# Patient Record
Sex: MALE | Race: OTHER | HISPANIC OR LATINO | Employment: STUDENT | URBAN - METROPOLITAN AREA
[De-identification: names, ages, dates, MRNs, and addresses within clinical notes are randomized per-mention and may not be internally consistent; named-entity substitution may affect disease eponyms.]

---
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
## 2025-02-20 NOTE — ED PROVIDER NOTES
History  Chief Complaint   Patient presents with    Neck Pain     Patient was tackle today at [email or web address] / [employer] has c/o left lateral neck pain  Denies numbness, tingling in extremities  14-year-old male presents with neck pain after a and injury of football practice  He reports he was wearing full pads and helmet  He was tackled from behind and whenever she went to the ground the other player hit his neck with his knee  He denies head injury or loss consciousness  He denies any neurological deficits including paresthesias, weakness, or other acute issues  His only complaint is that of right lateral neck pain which is worse with movement and direct palpation  Patient reports that he finished practice and afterwards was directed to the emergency department for evaluation  Neck Injury   Severity:  Moderate  Onset quality:  Sudden  Timing:  Constant  Progression:  Partially resolved  Chronicity:  New  Associated symptoms: myalgias    Associated symptoms: no abdominal pain, no chest pain, no congestion, no cough, no diarrhea, no ear pain, no fatigue, no fever, no headaches, no loss of consciousness, no nausea, no rash, no rhinorrhea, no shortness of breath, no sore throat, no vomiting and no wheezing        None       History reviewed  No pertinent past medical history  History reviewed  No pertinent surgical history  History reviewed  No pertinent family history  I have reviewed and agree with the history as documented  Social History   Substance Use Topics    Smoking status: Not on file    Smokeless tobacco: Not on file    Alcohol use Not on file        Review of Systems   Constitutional: Negative for activity change, chills, fatigue and fever  HENT: Negative  Negative for congestion, ear pain, postnasal drip, rhinorrhea, sinus pain, sore throat and trouble swallowing  Eyes: Negative  Respiratory: Negative  Negative for cough, shortness of breath and wheezing      Cardiovascular: Today's date 2/20/2025  Admission date 2/16/2025  Hospital Room /A   Referring Provider Holly Edwards PA-C     Reason for initial visit:  Evaluation and management of Type 2 diabetes mellitus with nephropathy, retinopathy and neuropathy    History of Present Illness:  (portions of this note are brought forward from previous endocrine note; reviewed and edited by me as appropriate)       This is a 60 year old Female with a history of DM2, ESRD on HD since 2015, R BKA 2020, HTN, HLD and hypercalcemia admitted for DDKT 2/16/2025. She is on steroid taper as follows:    2/19: prednisone 30 mg BID  2/20: prednisone 20 mg BID  2/21: prednisone 20 mg daily  2/28: prednisone 15 mg daily  3/7: prednisone 10 mg daily  3/14: prednisone 5 mg daily    Recent events/ROS: Low urine output 2/18 -received dialysis.  IV insulin stopped 2/18 at 10:50 AM. NPH decreased then stopped 2/19. Decreased appetite. NPO for AMS. May need to consider NG with tube feeds    Current hospital regimen for diabetes:   Humalog low-dose correction with meals    Current oral intake status Patient Diet Preferences Continuous Other - Enter in Comments at Right (No Cold Deli Meats)  Daily W Lunch; Ensure Max Protein/Max Protein Low Calorie Supplement, Chocolate Oral Nutrition Supplement  Daily W Dinner; Product Not Listed - See Order Comments (Strawberry Ensure Max) Oral Nutrition Supplement  Consistent Carb Moderate (45-75 Gm/Meal); Yes, Medical Nutrition Management by Rd (Registered Dietitian) Diet    Blood sugars while hospitalized   Recent Labs   Lab 02/19/25  1443 02/19/25  1539 02/19/25  1715 02/19/25  1811 02/19/25  2017 02/19/25  2155 02/19/25  2359 02/20/25  0158 02/20/25  0358 02/20/25  0602 02/20/25  0908   GLUCOSE BEDSIDE 55* 80 54* 96 81 83 90 110* 115* 113* 126*     Patient has Type 2 diabetes mellitus with nephropathy, retinopathy and neuropathy. Patient was diagnosed around 2002. Home regimen includes Lantus 20 units QHS, Humalog  10 units AC and Mounjaro 10 mg weekly. No insulin the past few weeks. BG  mg/dL without insulin.    Patient has not been hospitalized or utilized emergency services for blood sugars in the recent past. Patient sees Dr. Hernandez for diabetes management as an outpatient. Last visit 4/2023. Has a scheduled visit 2/20/25.    Diabetes complications include:   Retinopathy - retinopathy - proliferative right eye and retinopathy - proliferative left eye.    Nephropathy- ESRD on HD, now s/p DDKT 2/16/2025.  Neuropathy- Peripheral. Patient does have a history of ulcers and/or amputations to the lower extremities.    History:  Past medical, surgical, family and social history reviewed from initial endocrine note.     Physical Exam:    Visit Vitals  /77 (BP Location: LUE - Left upper extremity, Patient Position: Semi-Agarwal's)   Pulse 97   Temp 97.5 °F (36.4 °C) (Oral)   Resp 16   Ht 5' 6\" (1.676 m)   Wt 71 kg (156 lb 8.4 oz)   LMP 07/11/2018 (Approximate)   SpO2 100%   BMI 25.26 kg/m²     Constitutional: Resting, well nourished female and in NAD.  Respiratory: non-labored. No cyanosis.   Cardiac: RRR per monitor  GI:  non-distended and no guarding.   Skin: Warm and dry, normal texture and temperature and no visible rashes.    Musculoskeletal: RIVERA x4, R BKA with prosthesis in place  Neuro:lethargic     Lab Review:  Hemoglobin A1C (%)   Date Value   01/24/2025 5.4     HGB (g/dL)   Date Value   02/20/2025 7.9 (L)     Creatinine (mg/dL)   Date Value   02/20/2025 3.70 (H)         Latest Reference Range & Units 02/04/25 10:49   CALCIUM 8.4 - 10.2 mg/dL 11.0 (H)   VITAMIND, 25 HYDROXY 30.0 - 100.0 ng/mL 19.4 (L)   PARATHYROID HORMONE 19 - 88 pg/mL 377 (H)     Assessment:  Type 2 diabetes mellitus with nephropathy, retinopathy and neuropathy  DDKT 2/16/2025  R BKA amputation on 4/24/20  Hypertension  Hyperlipidemia, on statin PTA   Hypercalcemia with secondary HPT, 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    Plan:  Not hungry after surgery,  Negative for chest pain  Gastrointestinal: Negative for abdominal pain, constipation, diarrhea, nausea and vomiting  Endocrine: Negative  Genitourinary: Negative  Musculoskeletal: Positive for myalgias  Negative for arthralgias and back pain  Skin: Negative  Negative for rash  Allergic/Immunologic: Negative  Neurological: Negative  Negative for loss of consciousness and headaches  Hematological: Negative  Psychiatric/Behavioral: Negative  Physical Exam  Physical Exam   Constitutional: He is oriented to person, place, and time  He appears well-developed and well-nourished  No distress  HENT:   Head: Normocephalic and atraumatic  Eyes: Pupils are equal, round, and reactive to light  Neck: Trachea normal  Neck supple  Spinous process tenderness (minimal C4-C6) and muscular tenderness present  Cardiovascular: Normal rate, regular rhythm and normal heart sounds  Pulmonary/Chest: Effort normal and breath sounds normal  No respiratory distress  Abdominal: Soft  Bowel sounds are normal  He exhibits no distension  There is no tenderness  There is no guarding  Musculoskeletal: Normal range of motion  He exhibits no edema  Neurological: He is alert and oriented to person, place, and time  Skin: Skin is warm and dry  Capillary refill takes less than 2 seconds  He is not diaphoretic  Psychiatric: He has a normal mood and affect  His behavior is normal    Nursing note and vitals reviewed        Vital Signs  ED Triage Vitals [10/15/18 1932]   Temperature Pulse Respirations Blood Pressure SpO2   97 4 °F (36 3 °C) 68 16 (!) 153/57 99 %      Temp src Heart Rate Source Patient Position - Orthostatic VS BP Location FiO2 (%)   Tympanic Monitor Sitting Left arm --      Pain Score       --           Vitals:    10/15/18 1932 10/15/18 2143   BP: (!) 153/57 117/73   Pulse: 68 67   Patient Position - Orthostatic VS: Sitting Sitting       Visual Acuity      ED Medications  Medications - No but was also on Mounjaro outpatient.  Recurrent low blood sugars - NPH has been stopped   - Continue Humalog low-dose correction only with meals  - Continue to check BG TID AC, HS and PRN   - Continue hypoglycemia protocol PRN   - Continue consistent carb diet  .  Please notify endocrine if tube feeds are started    Donna is ready for discharge from my viewpoint: No  Workup needed prior to discharge: None   Medications changes at discharge:   insulin, doses TBD  Follow up appointments needed after discharge:   Dr. Hernandez, endocrine    Estimated Date of Discharge documented: 2/28/2025    We will continue to follow and titrate/manage medications as appropriate.     Case discussed and plan developed with attending endocrinologist, RUPINDER Underwood      Patient seen and examined with JOSE  Glycemic trends, labs, medications reviewed.  >50% of the patient care time, including face to face contact and coordination of care was spent by myself as the primary provider.  100% of the Medical Decision Making for this patient was done by myself    Eats little.  No nausea, vomiting.    Blood pressure 118/53, pulse (!) 101, temperature 98.2 °F (36.8 °C), temperature source Axillary, resp. rate (!) 12, height 5' 6\" (1.676 m), weight 70.4 kg (155 lb 3.3 oz), last menstrual period 07/11/2018, SpO2 100%, not currently breastfeeding.  AAOx3, NAD. Resting   EOMI  Neck supple.  Normal respiratory efforts.  Visible skin clear    Plan:  As above. Not eating, minimal insulin requirements  Continue to monitor on correction scale today  Dw ICU team        data to display    Diagnostic Studies  Results Reviewed     None                 CT spine cervical without contrast   Final Result by Lamon Curling, MD (10/15 2156)      No acute fracture or traumatic malalignment of the visualized cervical spine  Workstation performed: [id number]                    Procedures  Procedures       Phone Contacts  ED Phone Contact    ED Course                               MDM  Number of Diagnoses or Management Options  Diagnosis management comments: 80-year-old boy presents with cervical pain after an injury while playing football  He denies headache or loss of consciousness  His pain is primarily right-sided but he has mild pain along his mid cervical spine  CT is negative for acute fracture or malalignment  He is fully neurologically intact  Discussed cervical strain sprain instructions was as well as need for outpatient follow-up as well as reasons to return to the ER  Amount and/or Complexity of Data Reviewed  Tests in the radiology section of CPT®: ordered and reviewed  Independent visualization of images, tracings, or specimens: yes      CritCare Time    Disposition  Final diagnoses:   None     ED Disposition     None      Follow-up Information    None         Patient's Medications    No medications on file     No discharge procedures on file      ED Provider  Electronically Signed by           Lela Tariq DO  10/15/18 0714

## 2025-06-08 ENCOUNTER — HOSPITAL ENCOUNTER (EMERGENCY)
Facility: HOSPITAL | Age: 20
Discharge: HOME | End: 2025-06-08
Attending: EMERGENCY MEDICINE | Admitting: EMERGENCY MEDICINE
Payer: COMMERCIAL

## 2025-06-08 ENCOUNTER — APPOINTMENT (EMERGENCY)
Dept: RADIOLOGY | Facility: HOSPITAL | Age: 20
End: 2025-06-08
Payer: COMMERCIAL

## 2025-06-08 VITALS
TEMPERATURE: 99.9 F | HEART RATE: 95 BPM | DIASTOLIC BLOOD PRESSURE: 83 MMHG | OXYGEN SATURATION: 97 % | WEIGHT: 249.6 LBS | RESPIRATION RATE: 20 BRPM | SYSTOLIC BLOOD PRESSURE: 138 MMHG

## 2025-06-08 DIAGNOSIS — R07.9 CHEST PAIN, UNSPECIFIED TYPE: Primary | ICD-10-CM

## 2025-06-08 LAB
ATRIAL RATE: 95 BPM
P AXIS: 32 DEGREES
PR INTERVAL: 118 MS
QRS AXIS: 39 DEGREES
QRSD INTERVAL: 84 MS
QT INTERVAL: 326 MS
QTC INTERVAL: 410 MS
T WAVE AXIS: 32 DEGREES
VENTRICULAR RATE: 95 BPM

## 2025-06-08 PROCEDURE — 93005 ELECTROCARDIOGRAM TRACING: CPT

## 2025-06-08 PROCEDURE — 96372 THER/PROPH/DIAG INJ SC/IM: CPT

## 2025-06-08 PROCEDURE — 93010 ELECTROCARDIOGRAM REPORT: CPT | Performed by: INTERNAL MEDICINE

## 2025-06-08 PROCEDURE — 99284 EMERGENCY DEPT VISIT MOD MDM: CPT | Performed by: EMERGENCY MEDICINE

## 2025-06-08 PROCEDURE — 71046 X-RAY EXAM CHEST 2 VIEWS: CPT

## 2025-06-08 PROCEDURE — 99284 EMERGENCY DEPT VISIT MOD MDM: CPT

## 2025-06-08 RX ORDER — KETOROLAC TROMETHAMINE 30 MG/ML
15 INJECTION, SOLUTION INTRAMUSCULAR; INTRAVENOUS ONCE
Status: COMPLETED | OUTPATIENT
Start: 2025-06-08 | End: 2025-06-08

## 2025-06-08 RX ADMIN — KETOROLAC TROMETHAMINE 15 MG: 30 INJECTION, SOLUTION INTRAMUSCULAR; INTRAVENOUS at 22:51

## 2025-06-09 NOTE — ED PROVIDER NOTES
"Time reflects when diagnosis was documented in both MDM as applicable and the Disposition within this note       Time User Action Codes Description Comment    6/8/2025 11:21 PM Juaquin Lewis Add [R07.9] Chest pain, unspecified type           ED Disposition       ED Disposition   Discharge    Condition   Stable    Date/Time   Sun Jun 8, 2025 11:21 PM    Comment   Monroe Cuevas discharge to home/self care.                   Assessment & Plan       Medical Decision Making  20-year-old male, presents with chest pain.  Differential diagnosis includes ACS, pneumothorax, muscle pain among other diagnoses.  Patient looks well and in no distress.  Patient with no risk factors for cardiac disease, reports he has had similar symptoms in the past.  Will give pain medication, EKG chest x-ray ordered.    Amount and/or Complexity of Data Reviewed  Radiology: ordered and independent interpretation performed. Decision-making details documented in ED Course.  ECG/medicine tests: ordered and independent interpretation performed. Decision-making details documented in ED Course.    Risk  Prescription drug management.        ED Course as of 06/08/25 2323   Sun Jun 08, 2025   2319 Chest x-ray dependently reviewed myself, no pneumothorax, infiltrate, or effusion.  No acute findings.   2322 Patient reports feeling better, results reviewed and discussed with patient, no acute abnormality.  Patient instructed to follow-up with primary doctor, follow-up or return for any worsening or new concerning symptoms.       Medications   ketorolac (TORADOL) injection 15 mg (15 mg Intramuscular Given 6/8/25 2251)       ED Risk Strat Scores              CRAFFT      Flowsheet Row Most Recent Value   CRAFFT Initial Screen: During the past 12 months, did you:    1. Drink any alcohol (more than a few sips)?  No Filed at: 06/08/2025 2308   2. Smoke any marijuana or hashish No Filed at: 06/08/2025 2308   3. Use anything else to get high? (\"anything else\" includes " illegal drugs, over the counter and prescription drugs, and things that you sniff or 'mcdonald')? No Filed at: 06/08/2025 3931              No data recorded                            History of Present Illness       Chief Complaint   Patient presents with    Chest Pain     Pt reports he woke up this morning with pins and needles all over his body and chest pain. Pt reports earlier today he had some SOB but does not upon arrival. Pt states he thinks he has hx of anxiety. No meds pta.        Past Medical History[1]   Past Surgical History[2]   Family History[3]   Social History[4]   E-Cigarette/Vaping    E-Cigarette Use Current Every Day User       E-Cigarette/Vaping Substances    Nicotine Yes       I have reviewed and agree with the history as documented.     20-year-old male, presents with chest pain.  Reports intermittent, pinching pain to left chest that has been occurring for the past 2 weeks.  Patient states it is worse when he lies down.  Patient denies any shortness of breath when asked by myself.  Denies any pain or difficulty with exertion.  Patient states he has had similar symptoms in the past.  Denies any fevers or cough.  No leg pain or swelling.      History provided by:  Patient   used: No    Chest Pain  Associated symptoms: no cough, no fever and no shortness of breath        Review of Systems   Constitutional: Negative.  Negative for fever.   Respiratory: Negative.  Negative for cough and shortness of breath.    Cardiovascular:  Positive for chest pain.   Gastrointestinal: Negative.    Neurological: Negative.            Objective       ED Triage Vitals   Temperature Pulse Blood Pressure Respirations SpO2 Patient Position - Orthostatic VS   06/08/25 2229 06/08/25 2229 06/08/25 2240 06/08/25 2229 06/08/25 2229 06/08/25 2240   99.9 °F (37.7 °C) (!) 117 138/83 20 97 % Lying      Temp Source Heart Rate Source BP Location FiO2 (%) Pain Score    06/08/25 2229 06/08/25 2229 06/08/25 2240 --  06/08/25 2251    Oral Monitor Right arm  6      Vitals      Date and Time Temp Pulse SpO2 Resp BP Pain Score FACES Pain Rating User   06/08/25 2251 -- -- -- -- -- 6 -- JT   06/08/25 2247 -- 95 -- -- -- -- -- JG   06/08/25 2240 -- -- -- -- 138/83 -- -- SD   06/08/25 2229 99.9 °F (37.7 °C) 117 97 % 20 -- -- -- SD            Physical Exam  Vitals and nursing note reviewed.   Constitutional:       General: He is not in acute distress.  HENT:      Head: Normocephalic and atraumatic.      Mouth/Throat:      Mouth: Mucous membranes are moist.      Pharynx: Oropharynx is clear.     Eyes:      Extraocular Movements: Extraocular movements intact.      Pupils: Pupils are equal, round, and reactive to light.       Cardiovascular:      Rate and Rhythm: Normal rate and regular rhythm.      Pulses:           Radial pulses are 2+ on the right side and 2+ on the left side.      Heart sounds: No murmur heard.  Pulmonary:      Effort: Pulmonary effort is normal.      Breath sounds: Normal breath sounds.   Abdominal:      Palpations: Abdomen is soft.      Tenderness: There is no abdominal tenderness.     Musculoskeletal:         General: No swelling or tenderness. Normal range of motion.      Cervical back: Normal range of motion and neck supple.     Skin:     General: Skin is warm and dry.     Neurological:      General: No focal deficit present.      Mental Status: He is alert and oriented to person, place, and time.      Motor: No weakness.      Gait: Gait normal.         Results Reviewed       None            XR chest 2 views    (Results Pending)       ECG 12 Lead Documentation Only    Date/Time: 6/8/2025 10:39 PM    Performed by: Juaquin Lewis MD  Authorized by: Juaquin Lewis MD    ECG reviewed by me, the ED Provider: yes    Patient location:  ED  Rate:     ECG rate assessment: normal    Rhythm:     Rhythm: sinus rhythm    Ectopy:     Ectopy: none    QRS:     QRS axis:  Normal    QRS intervals:  Normal  Conduction:      Conduction: normal    ST segments:     ST segments:  Normal  Comments:      Sinus rhythm at 95, no acute changes      ED Medication and Procedure Management   Prior to Admission Medications   Prescriptions Last Dose Informant Patient Reported? Taking?   lidocaine (XYLOCAINE) 5 % ointment   No No   Sig: Apply topically as needed for mild pain      Facility-Administered Medications: None     Patient's Medications   Discharge Prescriptions    No medications on file     No discharge procedures on file.  ED SEPSIS DOCUMENTATION   Time reflects when diagnosis was documented in both MDM as applicable and the Disposition within this note       Time User Action Codes Description Comment    6/8/2025 11:21 PM Juaquin Lewis Add [R07.9] Chest pain, unspecified type                      [1] No past medical history on file.  [2] No past surgical history on file.  [3] No family history on file.  [4]   Social History  Tobacco Use    Smoking status: Never    Smokeless tobacco: Never   Vaping Use    Vaping status: Every Day    Substances: Nicotine   Substance Use Topics    Alcohol use: Not Currently    Drug use: Yes     Types: Marijuana        Juaquin Lewis MD  06/08/25 2160

## 2025-06-10 ENCOUNTER — HOSPITAL ENCOUNTER (EMERGENCY)
Facility: HOSPITAL | Age: 20
Discharge: HOME | End: 2025-06-10
Attending: EMERGENCY MEDICINE | Admitting: EMERGENCY MEDICINE
Payer: COMMERCIAL

## 2025-06-10 VITALS
HEART RATE: 85 BPM | DIASTOLIC BLOOD PRESSURE: 70 MMHG | TEMPERATURE: 98.1 F | OXYGEN SATURATION: 99 % | SYSTOLIC BLOOD PRESSURE: 123 MMHG | WEIGHT: 250 LBS | RESPIRATION RATE: 16 BRPM

## 2025-06-10 DIAGNOSIS — R07.9 CHEST PAIN: Primary | ICD-10-CM

## 2025-06-10 DIAGNOSIS — M94.0 COSTOCHONDRITIS: ICD-10-CM

## 2025-06-10 DIAGNOSIS — F41.9 ANXIETY: ICD-10-CM

## 2025-06-10 LAB
ATRIAL RATE: 77 BPM
P AXIS: 24 DEGREES
PR INTERVAL: 116 MS
QRS AXIS: 36 DEGREES
QRSD INTERVAL: 82 MS
QT INTERVAL: 352 MS
QTC INTERVAL: 399 MS
T WAVE AXIS: 30 DEGREES
VENTRICULAR RATE: 77 BPM

## 2025-06-10 PROCEDURE — 96372 THER/PROPH/DIAG INJ SC/IM: CPT

## 2025-06-10 PROCEDURE — 99284 EMERGENCY DEPT VISIT MOD MDM: CPT | Performed by: EMERGENCY MEDICINE

## 2025-06-10 PROCEDURE — 93010 ELECTROCARDIOGRAM REPORT: CPT

## 2025-06-10 PROCEDURE — 93005 ELECTROCARDIOGRAM TRACING: CPT

## 2025-06-10 PROCEDURE — 99284 EMERGENCY DEPT VISIT MOD MDM: CPT

## 2025-06-10 RX ORDER — KETOROLAC TROMETHAMINE 30 MG/ML
30 INJECTION, SOLUTION INTRAMUSCULAR; INTRAVENOUS ONCE
Status: COMPLETED | OUTPATIENT
Start: 2025-06-10 | End: 2025-06-10

## 2025-06-10 RX ORDER — HYDROXYZINE HYDROCHLORIDE 25 MG/1
25 TABLET, FILM COATED ORAL ONCE
Status: COMPLETED | OUTPATIENT
Start: 2025-06-10 | End: 2025-06-10

## 2025-06-10 RX ORDER — HYDROXYZINE HYDROCHLORIDE 25 MG/1
25 TABLET, FILM COATED ORAL EVERY 6 HOURS PRN
Qty: 12 TABLET | Refills: 0 | Status: SHIPPED | OUTPATIENT
Start: 2025-06-10

## 2025-06-10 RX ORDER — IBUPROFEN 600 MG/1
600 TABLET, FILM COATED ORAL EVERY 6 HOURS PRN
Qty: 30 TABLET | Refills: 0 | Status: SHIPPED | OUTPATIENT
Start: 2025-06-10

## 2025-06-10 RX ADMIN — KETOROLAC TROMETHAMINE 30 MG: 30 INJECTION, SOLUTION INTRAMUSCULAR; INTRAVENOUS at 00:25

## 2025-06-10 RX ADMIN — HYDROXYZINE HYDROCHLORIDE 25 MG: 25 TABLET, FILM COATED ORAL at 01:39

## 2025-06-10 NOTE — ED NOTES
Pt discharged by ED provider Darleen. This RN not at the bedside.      Candice Vargas, SAEID  06/10/25 0139

## 2025-06-10 NOTE — ED PROVIDER NOTES
Time reflects when diagnosis was documented in both MDM as applicable and the Disposition within this note       Time User Action Codes Description Comment    6/10/2025 12:20 AM Lee Morejon [R07.9] Chest pain     6/10/2025 12:20 AM Lee Morejon [M94.0] Costochondritis     6/10/2025  1:35 AM Lee Morejon [F41.9] Anxiety           ED Disposition       ED Disposition   Discharge    Condition   Stable    Date/Time   Tue Reinaldo 10, 2025 12:20 AM    Comment   Monroe Cuevas discharge to home/self care.                   Assessment & Plan       Medical Decision Making  20-year old male presents with complaint of chest pain.  He states that he was seen yesterday and diagnosed with spasms in his chest.  He had recurrence of pain today and took 4 mg of ibuprofen around noon.  He has had no meds since then.  Pain is worse with palpation and deep inspiration and is on both sides of his sternum.  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  Prehospital EKG is unremarkable as is an EKG here.  Suspect costochondritis as part of the cause for his pain based on history and exam.  He has noted to have some spasms across his pectoral muscle as experienced yesterday.  Patient also reports a history of anxiety and this does seem to be contributing to his current presentation.  Discussed supportive care measures and expected clinical course to follow-up as an outpatient.  No indication for laboratory workup, imaging, or other invasive studies.    Risk  Prescription drug management.        ED Course as of 06/10/25 0136   Tue Reinaldo 10, 2025   0135 Chest pain has nearly resolved.  He continues to feel mildly anxious but that has also improved.  Will give a dose of Atarax and a prescription for the same.  He will be following up with his primary care provider.       Medications   hydrOXYzine HCL (ATARAX) tablet 25 mg (has no administration in time range)   ketorolac  (TORADOL) injection 30 mg (30 mg Intramuscular Given 6/10/25 0025)       ED Risk Strat Scores                    No data recorded                            History of Present Illness       Chief Complaint   Patient presents with    Chest Pain     Reports chest pain again tonight - states that he was here recently for the same. Did feel some shortness of breath initially.       Past Medical History[1]   Past Surgical History[2]   Family History[3]   Social History[4]   E-Cigarette/Vaping    E-Cigarette Use Current Every Day User       E-Cigarette/Vaping Substances    Nicotine Yes       I have reviewed and agree with the history as documented.       Chest Pain  Chest pain location: parasternal.  Pain quality: sharp and stabbing    Pain radiates to:  Does not radiate  Pain severity:  Moderate  Onset quality:  Gradual  Duration:  1 day  Timing:  Intermittent  Progression:  Waxing and waning  Relieved by: ibuprofen.  Worsened by:  Deep breathing and movement  Associated symptoms: anxiety    Associated symptoms: no back pain, no fever, no nausea, no shortness of breath and not vomiting        Review of Systems   Constitutional:  Negative for fever.   Respiratory:  Negative for shortness of breath.    Cardiovascular:  Positive for chest pain.   Gastrointestinal:  Negative for nausea and vomiting.   Musculoskeletal:  Negative for back pain.   All other systems reviewed and are negative.          Objective       ED Triage Vitals   Temperature Pulse Blood Pressure Respirations SpO2 Patient Position - Orthostatic VS   06/10/25 0022 06/10/25 0022 06/10/25 0022 06/10/25 0022 06/10/25 0022 06/10/25 0022   98.1 °F (36.7 °C) 77 (!) 141/103 20 96 % Sitting      Temp Source Heart Rate Source BP Location FiO2 (%) Pain Score    06/10/25 0022 06/10/25 0022 06/10/25 0022 -- 06/10/25 0025    Oral Monitor Left arm  8      Vitals      Date and Time Temp Pulse SpO2 Resp BP Pain Score FACES Pain Rating User   06/10/25 0135 -- 85 99 % 16  123/70 -- -- AK   06/10/25 0025 -- -- -- -- -- 8 --    06/10/25 0022 98.1 °F (36.7 °C) 77 96 % 20 141/103 -- -- EY            Physical Exam  Vitals and nursing note reviewed.   Constitutional:       General: He is not in acute distress.     Appearance: Normal appearance. He is well-developed. He is not ill-appearing, toxic-appearing or diaphoretic.   HENT:      Head: Normocephalic and atraumatic.      Right Ear: External ear normal.      Left Ear: External ear normal.      Nose: Nose normal.      Mouth/Throat:      Mouth: Mucous membranes are moist.      Pharynx: Oropharynx is clear.     Eyes:      Conjunctiva/sclera: Conjunctivae normal.      Pupils: Pupils are equal, round, and reactive to light.       Cardiovascular:      Rate and Rhythm: Normal rate and regular rhythm.      Heart sounds: Normal heart sounds.   Pulmonary:      Effort: Pulmonary effort is normal. No respiratory distress.      Breath sounds: Normal breath sounds.   Chest:     Abdominal:      General: Bowel sounds are normal. There is no distension.      Palpations: Abdomen is soft.      Tenderness: There is no abdominal tenderness. There is no guarding.     Musculoskeletal:         General: Normal range of motion.      Cervical back: Neck supple. No rigidity.      Right lower leg: No edema.      Left lower leg: No edema.     Skin:     General: Skin is warm and dry.      Capillary Refill: Capillary refill takes less than 2 seconds.     Neurological:      General: No focal deficit present.      Mental Status: He is alert and oriented to person, place, and time.     Psychiatric:         Mood and Affect: Mood is anxious.         Speech: Speech normal.         Behavior: Behavior normal. Behavior is cooperative.         Results Reviewed       None            No orders to display       ECG 12 Lead Documentation Only    Date/Time: 6/10/2025 12:23 AM    Performed by: Lee Morejon DO  Authorized by: Lee Morejon DO    ECG reviewed by me, the ED  Provider: yes    Patient location:  ED  Rate:     ECG rate:  77    ECG rate assessment: normal    Rhythm:     Rhythm: sinus rhythm    Ectopy:     Ectopy: none    QRS:     QRS axis:  Normal  Conduction:     Conduction: normal    ST segments:     ST segments:  Normal  T waves:     T waves: normal        ED Medication and Procedure Management   Prior to Admission Medications   Prescriptions Last Dose Informant Patient Reported? Taking?   lidocaine (XYLOCAINE) 5 % ointment   No No   Sig: Apply topically as needed for mild pain      Facility-Administered Medications: None     Patient's Medications   Discharge Prescriptions    HYDROXYZINE HCL (ATARAX) 25 MG TABLET    Take 1 tablet (25 mg total) by mouth every 6 (six) hours as needed for anxiety       Start Date: 6/10/2025 End Date: --       Order Dose: 25 mg       Quantity: 12 tablet    Refills: 0    IBUPROFEN (MOTRIN) 600 MG TABLET    Take 1 tablet (600 mg total) by mouth every 6 (six) hours as needed for mild pain       Start Date: 6/10/2025 End Date: --       Order Dose: 600 mg       Quantity: 30 tablet    Refills: 0     No discharge procedures on file.  ED SEPSIS DOCUMENTATION   Time reflects when diagnosis was documented in both MDM as applicable and the Disposition within this note       Time User Action Codes Description Comment    6/10/2025 12:20 AM Lee Morejon [R07.9] Chest pain     6/10/2025 12:20 AM Lee Morejon [M94.0] Costochondritis     6/10/2025  1:35 AM Lee Morejon [F41.9] Anxiety                    [1] No past medical history on file.  [2] No past surgical history on file.  [3] No family history on file.  [4]   Social History  Tobacco Use    Smoking status: Never    Smokeless tobacco: Never   Vaping Use    Vaping status: Every Day    Substances: Nicotine   Substance Use Topics    Alcohol use: Not Currently    Drug use: Yes     Types: Marijuana        Lee Morejon DO  06/10/25 0136

## 2025-06-11 ENCOUNTER — APPOINTMENT (EMERGENCY)
Dept: RADIOLOGY | Facility: HOSPITAL | Age: 20
End: 2025-06-11
Payer: COMMERCIAL

## 2025-06-11 ENCOUNTER — HOSPITAL ENCOUNTER (EMERGENCY)
Facility: HOSPITAL | Age: 20
Discharge: HOME | End: 2025-06-11
Attending: EMERGENCY MEDICINE
Payer: COMMERCIAL

## 2025-06-11 VITALS
OXYGEN SATURATION: 98 % | DIASTOLIC BLOOD PRESSURE: 93 MMHG | SYSTOLIC BLOOD PRESSURE: 162 MMHG | RESPIRATION RATE: 18 BRPM | TEMPERATURE: 98.2 F | HEART RATE: 82 BPM | WEIGHT: 251.77 LBS

## 2025-06-11 DIAGNOSIS — F41.9 ANXIETY: ICD-10-CM

## 2025-06-11 DIAGNOSIS — R07.9 CHEST PAIN: Primary | ICD-10-CM

## 2025-06-11 PROCEDURE — 99284 EMERGENCY DEPT VISIT MOD MDM: CPT

## 2025-06-11 PROCEDURE — 93005 ELECTROCARDIOGRAM TRACING: CPT

## 2025-06-11 PROCEDURE — 71046 X-RAY EXAM CHEST 2 VIEWS: CPT

## 2025-06-11 RX ORDER — HYDROXYZINE HYDROCHLORIDE 25 MG/1
25 TABLET, FILM COATED ORAL ONCE
Status: COMPLETED | OUTPATIENT
Start: 2025-06-11 | End: 2025-06-11

## 2025-06-11 RX ADMIN — HYDROXYZINE HYDROCHLORIDE 25 MG: 25 TABLET ORAL at 21:32

## 2025-06-12 LAB
ATRIAL RATE: 74 BPM
P AXIS: 3 DEGREES
PR INTERVAL: 126 MS
QRS AXIS: 27 DEGREES
QRSD INTERVAL: 86 MS
QT INTERVAL: 350 MS
QTC INTERVAL: 389 MS
T WAVE AXIS: 19 DEGREES
VENTRICULAR RATE: 74 BPM

## 2025-06-12 PROCEDURE — 93010 ELECTROCARDIOGRAM REPORT: CPT | Performed by: INTERNAL MEDICINE

## 2025-06-12 NOTE — ED NOTES
Pt was not interested in speaking with ED Crisis Worker, but did request outpatient resources for therapy due to a recent break up when he was speaking with ED provider. Crisis worker provided ED PA-C with mental health resources.

## 2025-06-12 NOTE — DISCHARGE INSTRUCTIONS
I've provided you with outpatient resources for talk therapy - please reach out to them for your anxiety. Please  your medications from the pharmacy for anxiety.    Return to ED with new or worsening symptoms.

## 2025-06-12 NOTE — ED PROVIDER NOTES
"Time reflects when diagnosis was documented in both MDM as applicable and the Disposition within this note       Time User Action Codes Description Comment    6/11/2025 10:00 PM Dannielle Mcclure Add [R07.9] Chest pain     6/11/2025 10:00 PM Dannielle Mcclure Add [F41.9] Anxiety           ED Disposition       ED Disposition   Discharge    Condition   Stable    Date/Time   Wed Jun 11, 2025 10:00 PM    Comment   Monroe Cuevas discharge to home/self care.                   Assessment & Plan       Medical Decision Making  20-year-old male presenting for evaluation of chest pain for the past 4 hours, see HPI.  Similar ED presentations on 6/8 and 6/10.  Patient endorses stressors at home including recent break-up.  Pain is reproducible on exam, low suspicion for cardiac etiology at this time.  Will check EKG and chest x-ray to rule out arrhythmia, ACS, pneumonia, pneumothorax.  Suspect anxiety is a large contributor to patient's symptoms.  EKG normal sinus rhythm with no ST or T wave changes.  Chest x-ray negative per my read.  Patient received Atarax with improvement in symptoms.  Given outpatient talk therapy resources.  Encouraged patient to  his prescription from the pharmacy, follow-up with outpatient therapy.  Return precautions provided.  Patient discharged home to self-care.    Amount and/or Complexity of Data Reviewed  Radiology: ordered and independent interpretation performed.    Risk  Prescription drug management.             Medications   hydrOXYzine HCL (ATARAX) tablet 25 mg (25 mg Oral Given 6/11/25 2132)       ED Risk Strat Scores              CRAFFT      Flowsheet Row Most Recent Value   CRAFFT Initial Screen: During the past 12 months, did you:    1. Drink any alcohol (more than a few sips)?  No Filed at: 06/11/2025 2044   2. Smoke any marijuana or hashish No Filed at: 06/11/2025 2044   3. Use anything else to get high? (\"anything else\" includes illegal drugs, over the counter and prescription drugs, " and things that you sniff or 'mcdonald')? No Filed at: 06/11/2025 2044              No data recorded                            History of Present Illness       Chief Complaint   Patient presents with    Chest Pain     Pt c/o of L side CP described as burning since waking up from a nap earlier today. Pts mother reports pts had a recent breakup with GF and is not coping well with situation. Denies LORRAINE TOM. Pain 5/10. Took his muscle relaxer as patient was seen here for same this past Saturday.          Past Medical History[1]   Past Surgical History[2]   Family History[3]   Social History[4]   E-Cigarette/Vaping    E-Cigarette Use Current Every Day User       E-Cigarette/Vaping Substances    Nicotine Yes       I have reviewed and agree with the history as documented.     20-year-old male presenting for evaluation of burning chest pain which began approximately 4 hours ago.  Patient seen here for similar 6/10 and 6/8.  Patient states pain is located midsternally, it is tender when he presses on it.  Does not radiate.  Not pleuritic in nature.  No associated shortness of breath, lightheadedness, cough congestion, runny nose.  Patient's mother reports that the patient had a recent break-up with his girlfriend and is not coping well with the situation.  Patient does admit that the recent break-up is causing him anxiety, reports he would like outpatient resources.  Was not able to  his Atarax which was prescribed last ED visit.  Denies SI, HI, AH, VH.  Feels safe at home.  Is not interested in inpatient admission at this time.      Chest Pain  Associated symptoms: no abdominal pain, no back pain, no cough, no fever, no palpitations, no shortness of breath and not vomiting        Review of Systems   Constitutional:  Negative for chills and fever.   HENT:  Negative for ear pain and sore throat.    Eyes:  Negative for pain and visual disturbance.   Respiratory:  Negative for cough and shortness of breath.     Cardiovascular:  Positive for chest pain. Negative for palpitations.   Gastrointestinal:  Negative for abdominal pain and vomiting.   Genitourinary:  Negative for dysuria and hematuria.   Musculoskeletal:  Negative for arthralgias and back pain.   Skin:  Negative for color change and rash.   Neurological:  Negative for seizures and syncope.   Psychiatric/Behavioral:  The patient is nervous/anxious.    All other systems reviewed and are negative.          Objective       ED Triage Vitals [06/11/25 2043]   Temperature Pulse Blood Pressure Respirations SpO2 Patient Position - Orthostatic VS   98.2 °F (36.8 °C) 82 162/93 18 98 % Lying      Temp Source Heart Rate Source BP Location FiO2 (%) Pain Score    Oral Monitor Right arm -- 5      Vitals      Date and Time Temp Pulse SpO2 Resp BP Pain Score FACES Pain Rating User   06/11/25 2043 98.2 °F (36.8 °C) 82 98 % 18 162/93 5 -- MG            Physical Exam  Vitals and nursing note reviewed.   Constitutional:       General: He is not in acute distress.     Appearance: He is well-developed.   HENT:      Head: Normocephalic and atraumatic.     Eyes:      Conjunctiva/sclera: Conjunctivae normal.       Cardiovascular:      Rate and Rhythm: Normal rate and regular rhythm.      Heart sounds: No murmur heard.  Pulmonary:      Effort: Pulmonary effort is normal. No respiratory distress.      Breath sounds: Normal breath sounds.   Chest:     Abdominal:      Palpations: Abdomen is soft.      Tenderness: There is no abdominal tenderness.     Musculoskeletal:         General: No swelling.      Cervical back: Neck supple.     Skin:     General: Skin is warm and dry.      Capillary Refill: Capillary refill takes less than 2 seconds.     Neurological:      Mental Status: He is alert.     Psychiatric:         Mood and Affect: Mood normal.         Results Reviewed       None            XR chest 2 views   ED Interpretation by Dannielle Mcclure PA-C (06/11 2123)   No acute cardiopulmonary  disease          ECG 12 Lead Documentation Only    Date/Time: 6/11/2025 9:58 PM    Performed by: Dannielle Mcclure PA-C  Authorized by: Dannielle Mcclure PA-C    Indications / Diagnosis:  Chest pain  ECG reviewed by me, the ED Provider: yes    Patient location:  ED  Interpretation:     Interpretation: normal    Rate:     ECG rate:  74    ECG rate assessment: normal    Rhythm:     Rhythm: sinus rhythm    Ectopy:     Ectopy: none    QRS:     QRS axis:  Normal  Conduction:     Conduction: normal    ST segments:     ST segments:  Normal  T waves:     T waves: normal        ED Medication and Procedure Management   Prior to Admission Medications   Prescriptions Last Dose Informant Patient Reported? Taking?   hydrOXYzine HCL (ATARAX) 25 mg tablet   No No   Sig: Take 1 tablet (25 mg total) by mouth every 6 (six) hours as needed for anxiety   ibuprofen (MOTRIN) 600 mg tablet   No No   Sig: Take 1 tablet (600 mg total) by mouth every 6 (six) hours as needed for mild pain   lidocaine (XYLOCAINE) 5 % ointment   No No   Sig: Apply topically as needed for mild pain      Facility-Administered Medications: None     Discharge Medication List as of 6/11/2025 10:14 PM        CONTINUE these medications which have NOT CHANGED    Details   hydrOXYzine HCL (ATARAX) 25 mg tablet Take 1 tablet (25 mg total) by mouth every 6 (six) hours as needed for anxiety, Starting Tue 6/10/2025, Normal      ibuprofen (MOTRIN) 600 mg tablet Take 1 tablet (600 mg total) by mouth every 6 (six) hours as needed for mild pain, Starting Tue 6/10/2025, Normal      lidocaine (XYLOCAINE) 5 % ointment Apply topically as needed for mild pain, Starting Mon 10/23/2023, Normal           No discharge procedures on file.  ED SEPSIS DOCUMENTATION   Time reflects when diagnosis was documented in both MDM as applicable and the Disposition within this note       Time User Action Codes Description Comment    6/11/2025 10:00 PM Dannielle Mcclure Add [R07.9] Chest pain      6/11/2025 10:00 PM Dannielle Mcclure Add [F41.9] Anxiety                      [1] No past medical history on file.  [2] No past surgical history on file.  [3] No family history on file.  [4]   Social History  Tobacco Use    Smoking status: Never    Smokeless tobacco: Never   Vaping Use    Vaping status: Every Day    Substances: Nicotine   Substance Use Topics    Alcohol use: Not Currently    Drug use: Yes     Types: Marijuana        Dannielle Mcclure PA-C  06/12/25 0339

## 2025-06-15 ENCOUNTER — HOSPITAL ENCOUNTER (EMERGENCY)
Facility: HOSPITAL | Age: 20
Discharge: HOME | End: 2025-06-15
Attending: EMERGENCY MEDICINE | Admitting: EMERGENCY MEDICINE
Payer: COMMERCIAL

## 2025-06-15 VITALS
WEIGHT: 248.68 LBS | RESPIRATION RATE: 18 BRPM | SYSTOLIC BLOOD PRESSURE: 107 MMHG | TEMPERATURE: 97.9 F | OXYGEN SATURATION: 96 % | DIASTOLIC BLOOD PRESSURE: 83 MMHG | HEART RATE: 80 BPM

## 2025-06-15 DIAGNOSIS — M94.0 COSTOCHONDRITIS: Primary | ICD-10-CM

## 2025-06-15 PROCEDURE — 96372 THER/PROPH/DIAG INJ SC/IM: CPT

## 2025-06-15 PROCEDURE — 99284 EMERGENCY DEPT VISIT MOD MDM: CPT

## 2025-06-15 PROCEDURE — 93005 ELECTROCARDIOGRAM TRACING: CPT

## 2025-06-15 RX ORDER — LIDOCAINE 50 MG/G
1 PATCH TOPICAL ONCE
Status: DISCONTINUED | OUTPATIENT
Start: 2025-06-15 | End: 2025-06-15 | Stop reason: HOSPADM

## 2025-06-15 RX ORDER — KETOROLAC TROMETHAMINE 30 MG/ML
15 INJECTION, SOLUTION INTRAMUSCULAR; INTRAVENOUS ONCE
Status: COMPLETED | OUTPATIENT
Start: 2025-06-15 | End: 2025-06-15

## 2025-06-15 RX ADMIN — LIDOCAINE 5% 1 PATCH: 700 PATCH TOPICAL at 17:38

## 2025-06-15 RX ADMIN — KETOROLAC TROMETHAMINE 15 MG: 30 INJECTION, SOLUTION INTRAMUSCULAR; INTRAVENOUS at 17:38

## 2025-06-15 NOTE — ED NOTES
Patient called for triage and not present in waiting room, will reattempt     Jimena Cintron RN  06/15/25 1640

## 2025-06-15 NOTE — ED PROVIDER NOTES
"Time reflects when diagnosis was documented in both MDM as applicable and the Disposition within this note       Time User Action Codes Description Comment    6/15/2025  5:39 PM Win Clarke Add [M94.0] Costochondritis           ED Disposition       ED Disposition   Discharge    Condition   Stable    Date/Time   Sun Reinaldo 15, 2025  5:39 PM    Comment   Monroe Cuevas discharge to home/self care.                   Assessment & Plan       Medical Decision Making  Patient is a 20-year-old male coming in for evaluation of chest pain.  Does have a history of costochondritis.  Has not taken anything over-the-counter.  EKG is within normal limits, no sign of acute cardiac ischemia.  Patient was given supportive conditions discharged home and recommended  the medications he was previously prescribed    Risk  Prescription drug management.             Medications   lidocaine (LIDODERM) 5 % patch 1 patch (1 patch Topical Medication Applied 6/15/25 1738)   ketorolac (TORADOL) injection 15 mg (15 mg Intramuscular Given 6/15/25 1738)       ED Risk Strat Scores              CRAFFT      Flowsheet Row Most Recent Value   CRAFFT Initial Screen: During the past 12 months, did you:    1. Drink any alcohol (more than a few sips)?  No Filed at: 06/15/2025 1656   2. Smoke any marijuana or hashish No Filed at: 06/15/2025 1656   3. Use anything else to get high? (\"anything else\" includes illegal drugs, over the counter and prescription drugs, and things that you sniff or 'mcdonald')? No Filed at: 06/15/2025 1656              No data recorded                            History of Present Illness       Chief Complaint   Patient presents with    Muscle Pain     Mid chest spasm; patient says he gets this at times       Past Medical History[1]   Past Surgical History[2]   Family History[3]   Social History[4]   E-Cigarette/Vaping    E-Cigarette Use Current Every Day User       E-Cigarette/Vaping Substances    Nicotine Yes       I have " reviewed and agree with the history as documented.     Patient is a 20-year-old male coming in for evaluation of chest pain.  Started couple days ago, has became worse.  Patient has been seen numerous times over the last week, for similar complaints.  Patient reports that feels similar to previous instances, and medications given the emerged part and always helped with the pain.  Patient does have a history of costochondritis, per chart review.      Muscle Pain  Associated symptoms: chest pain    Associated symptoms: no abdominal pain, no fatigue, no fever, no nausea, no shortness of breath and no vomiting        Review of Systems   Constitutional:  Negative for chills, fatigue and fever.   Respiratory:  Negative for chest tightness and shortness of breath.    Cardiovascular:  Positive for chest pain. Negative for palpitations and leg swelling.   Gastrointestinal:  Negative for abdominal pain, nausea and vomiting.           Objective       ED Triage Vitals   Temperature Pulse Blood Pressure Respirations SpO2 Patient Position - Orthostatic VS   06/15/25 1655 06/15/25 1655 06/15/25 1655 06/15/25 1655 06/15/25 1655 --   97.9 °F (36.6 °C) 80 107/83 18 96 %       Temp src Heart Rate Source BP Location FiO2 (%) Pain Score    -- -- -- -- 06/15/25 1738        6      Vitals      Date and Time Temp Pulse SpO2 Resp BP Pain Score FACES Pain Rating User   06/15/25 1738 -- -- -- -- -- 6 -- AM   06/15/25 1655 97.9 °F (36.6 °C) 80 96 % 18 107/83 -- -- AM            Physical Exam  Vitals reviewed.   Constitutional:       Appearance: Normal appearance. He is normal weight.   HENT:      Head: Normocephalic and atraumatic.      Right Ear: External ear normal.      Left Ear: External ear normal.      Nose: Nose normal.     Eyes:      Conjunctiva/sclera: Conjunctivae normal.       Cardiovascular:      Rate and Rhythm: Normal rate.   Pulmonary:      Effort: Pulmonary effort is normal.   Chest:      Chest wall: Tenderness present. No  deformity, swelling, crepitus or edema.     Musculoskeletal:         General: Normal range of motion.      Cervical back: Normal range of motion.     Skin:     General: Skin is warm and dry.     Neurological:      Mental Status: He is alert.         Results Reviewed       None            No orders to display       Procedures    ED Medication and Procedure Management   Prior to Admission Medications   Prescriptions Last Dose Informant Patient Reported? Taking?   hydrOXYzine HCL (ATARAX) 25 mg tablet   No No   Sig: Take 1 tablet (25 mg total) by mouth every 6 (six) hours as needed for anxiety   ibuprofen (MOTRIN) 600 mg tablet   No No   Sig: Take 1 tablet (600 mg total) by mouth every 6 (six) hours as needed for mild pain   lidocaine (XYLOCAINE) 5 % ointment   No No   Sig: Apply topically as needed for mild pain      Facility-Administered Medications: None     Discharge Medication List as of 6/15/2025  5:39 PM        CONTINUE these medications which have NOT CHANGED    Details   hydrOXYzine HCL (ATARAX) 25 mg tablet Take 1 tablet (25 mg total) by mouth every 6 (six) hours as needed for anxiety, Starting Tue 6/10/2025, Normal      ibuprofen (MOTRIN) 600 mg tablet Take 1 tablet (600 mg total) by mouth every 6 (six) hours as needed for mild pain, Starting Tue 6/10/2025, Normal      lidocaine (XYLOCAINE) 5 % ointment Apply topically as needed for mild pain, Starting Mon 10/23/2023, Normal           No discharge procedures on file.  ED SEPSIS DOCUMENTATION   Time reflects when diagnosis was documented in both MDM as applicable and the Disposition within this note       Time User Action Codes Description Comment    6/15/2025  5:39 PM Win Clarke Add [M94.0] Costochondritis                    [1] No past medical history on file.  [2] No past surgical history on file.  [3] No family history on file.  [4]   Social History  Tobacco Use    Smoking status: Never    Smokeless tobacco: Never   Vaping Use    Vaping status:  Every Day    Substances: Nicotine   Substance Use Topics    Alcohol use: Not Currently    Drug use: Yes     Types: Marijuana        Win Clarke PA-C  06/15/25 0263

## 2025-06-15 NOTE — Clinical Note
Monroe Cuevas was seen and treated in our emergency department on 6/15/2025.    No restrictions            Diagnosis:     Monroe  .    He may return on this date: 06/16/2025         If you have any questions or concerns, please don't hesitate to call.      Win Clarke PA-C    ______________________________           _______________          _______________  Hospital Representative                              Date                                Time

## 2025-06-16 LAB
ATRIAL RATE: 66 BPM
P AXIS: 3 DEGREES
PR INTERVAL: 128 MS
QRS AXIS: 29 DEGREES
QRSD INTERVAL: 86 MS
QT INTERVAL: 366 MS
QTC INTERVAL: 384 MS
T WAVE AXIS: 28 DEGREES
VENTRICULAR RATE: 66 BPM

## 2025-06-16 PROCEDURE — 93010 ELECTROCARDIOGRAM REPORT: CPT | Performed by: INTERNAL MEDICINE
